# Patient Record
Sex: MALE | Race: WHITE | NOT HISPANIC OR LATINO | URBAN - METROPOLITAN AREA
[De-identification: names, ages, dates, MRNs, and addresses within clinical notes are randomized per-mention and may not be internally consistent; named-entity substitution may affect disease eponyms.]

---

## 2021-08-30 NOTE — IMPRESSION/PLAN
Impression: Other secondary cataract, bilateral: H26.493. Plan: No vision complaints at this time. No indication for treatment. Return if decreased vision.

## 2021-08-30 NOTE — IMPRESSION/PLAN
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 Recommend warm compresses, lid scrubs, and artificial tears QID or more. Discussed benefits of Omega-3 FA's. Will monitor patient for now. RTC if no improvement or worsens.

## 2022-02-23 NOTE — IMPRESSION/PLAN
Impression: Tear film insufficiency of bilateral lacrimal glands  Plan: Start art tears up to qid prn OU

## 2022-02-23 NOTE — IMPRESSION/PLAN
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
- likely ischemic etiology d/t extensive hemes & reduced VA 20/300 Plan: Onset 3 wk ago with severe vision loss, no changes since onset. Was seen with PCP 2 wk ago, reports BP normally ~140 but elevated today to 161/81mmHg. Will refer to retina in <2wk for f/u and notes to be sent to PCP.

## 2022-03-07 NOTE — IMPRESSION/PLAN
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 Micropulse OD performed today (4/9/60) without complication. R/B/A discussed at length.  Follow-up in 2-4 weeks for adjunctive avastin OD

## 2022-03-21 NOTE — IMPRESSION/PLAN
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 Micropulse OD performed 0/9/78 without complication. Continue adjunctive avastin OD as planned Avastin 1/3 OD administered today without complication. R/B/A discussed at length.  Follow-up in 4 weeks for avastin 2/3 OD

## 2022-04-18 NOTE — IMPRESSION/PLAN
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 Micropulse OD performed 2/4/30 without complication. Continue adjunctive avastin OD as planned Avastin 2/3 OD administered today without complication. R/B/A discussed at length.  Follow-up in 4 weeks for avastin 3/3 OD

## 2022-05-16 NOTE — IMPRESSION/PLAN
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 CME now resolving, continue [id number] avastin OD Avastin 1/3 OD administered today without complication. R/B/A discussed at length.  Follow-up in 4 weeks for avastin 2/3 OD

## 2022-06-21 NOTE — IMPRESSION/PLAN
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 CME now resolving, continue [id number] avastin OD Avastin 2/3 OD administered today without complication. R/B/A discussed at length.  Follow-up in 4 weeks for avastin 3/3 OD

## 2022-07-19 NOTE — IMPRESSION/PLAN
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 CME persists despite [id number] avastin OD, switch to Eylea OD Avastin 3/3 OD administered today without complication. R/B/A discussed at length.  Follow-up in 4 weeks for dilated exam, possible Eylea

## 2022-08-16 NOTE — IMPRESSION/PLAN
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 CME persists despite [id number] avastin OD, switch to Eylea OD Eylea (sample) 1/3 OD administered today without complication. R/B/A discussed at length.  Follow-up in 4 weeks for Eylea 2/3 OD

## 2022-09-13 NOTE — IMPRESSION/PLAN
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 CME persists despite [id number] avastin OD, switch to Eylea OD Eylea 2/3 OD administered today without complication. R/B/A discussed at length.  Follow-up in 4 weeks for Eylea 3/3 OD

## 2022-10-17 NOTE — IMPRESSION/PLAN
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 CME persists despite [id number] avastin OD, switch to Eylea OD Eylea 3/3 OD administered today without complication. R/B/A discussed at length.  Follow-up in 4 weeks for dilated exam, possible FA

## 2022-11-22 NOTE — IMPRESSION/PLAN
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 CME resolved, extend to 6 weeks Eylea 1/3 OD administered today without complication. R/B/A discussed at length. Follow-up in 6 weeks for Eylea 2/3 OD May update refraction

## 2023-01-03 NOTE — IMPRESSION/PLAN
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 CME resolved, extend to 6 weeks Eylea 2/3 OD administered today without complication. R/B/A discussed at length. Follow-up in 6 weeks for Eylea 3/3 OD May update refraction

## 2023-02-07 NOTE — IMPRESSION/PLAN
Impression: Other secondary cataract, bilateral: H26.493. Plan: Not primary cause of reduced vision OD, minimal effect on vision OS. 
No tx recommended at this time

## 2023-02-07 NOTE — IMPRESSION/PLAN
Impression: Central retinal vein occlusion, right eye, with macular edema: H34.8110.
-- dx 2/2022 Plan: Under care of Dr Laurie Birmingham Reviewed condition and contribution to reduced vision despite improvements in edema with injecitons.

## 2023-02-07 NOTE — IMPRESSION/PLAN
Impression: Presbyopia: H52.4. Plan: NI with refraction OD, pt did not appreciate any difference in vision. Will hold rx update.  Cont with current glasses

## 2023-02-07 NOTE — IMPRESSION/PLAN
Impression: Vitreous degeneration, right eye: H43.811. Plan: Reviewed stable findings, no retinal breaks, no detachment. 
Reviewed s/s of RD including flashes, incr floaters and curtain; if noted seek care asap

## 2023-02-07 NOTE — IMPRESSION/PLAN
Impression: Tear film insufficiency of bilateral lacrimal glands Plan: Dry eyes cont to contribute to the patient's complaints. Cont artificial tears qid prn OU.

## 2023-02-14 NOTE — IMPRESSION/PLAN
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 CME resolved, extend to 6 weeks Eylea 3/3 OD administered today without complication. R/B/A discussed at length.  Follow-up in 6 weeks for a dilated exam. 

May update refraction

## 2023-04-03 NOTE — IMPRESSION/PLAN
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 CME resolved, extend to 8 weeks Eylea 1/3 OD administered today without complication. R/B/A discussed at length. Follow-up in 8 weeks for Eylea 2/3 OD.

## 2023-05-23 ENCOUNTER — OFFICE VISIT (OUTPATIENT)
Dept: URBAN - METROPOLITAN AREA CLINIC 24 | Facility: CLINIC | Age: 88
End: 2023-05-23
Payer: MEDICARE

## 2023-05-23 DIAGNOSIS — H34.8110 CENTRAL RETINAL VEIN OCCLUSION, RIGHT EYE, WITH MACULAR EDEMA: Primary | ICD-10-CM

## 2023-05-23 PROCEDURE — 92134 CPTRZ OPH DX IMG PST SGM RTA: CPT | Performed by: OPHTHALMOLOGY

## 2023-05-23 PROCEDURE — 67028 INJECTION EYE DRUG: CPT | Performed by: OPHTHALMOLOGY

## 2023-05-23 ASSESSMENT — INTRAOCULAR PRESSURE
OD: 13
OS: 14

## 2023-05-23 NOTE — IMPRESSION/PLAN
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 CME resolved, extend to 8 weeks Eylea 2/3 OD administered today without complication. R/B/A discussed at length.  Follow-up in 8 weeks for Eylea 3/3 OD

## 2023-07-18 ENCOUNTER — OFFICE VISIT (OUTPATIENT)
Dept: URBAN - METROPOLITAN AREA CLINIC 24 | Facility: CLINIC | Age: 88
End: 2023-07-18
Payer: MEDICARE

## 2023-07-18 DIAGNOSIS — H34.8110 CENTRAL RETINAL VEIN OCCLUSION, RIGHT EYE, WITH MACULAR EDEMA: Primary | ICD-10-CM

## 2023-07-18 PROCEDURE — 92134 CPTRZ OPH DX IMG PST SGM RTA: CPT | Performed by: OPHTHALMOLOGY

## 2023-07-18 PROCEDURE — 67028 INJECTION EYE DRUG: CPT | Performed by: OPHTHALMOLOGY

## 2023-07-18 ASSESSMENT — INTRAOCULAR PRESSURE
OD: 12
OS: 13

## 2023-07-18 NOTE — IMPRESSION/PLAN
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 CME resolved, extend to 8 weeks Eylea 3/3 OD administered today without complication. R/B/A discussed at length.  Follow-up in 8 weeks for a dilated exam, possible FA, sooner PRN

## 2023-09-12 ENCOUNTER — OFFICE VISIT (OUTPATIENT)
Dept: URBAN - METROPOLITAN AREA CLINIC 24 | Facility: CLINIC | Age: 88
End: 2023-09-12
Payer: MEDICARE

## 2023-09-12 DIAGNOSIS — H34.8110 CENTRAL RETINAL VEIN OCCLUSION, RIGHT EYE, WITH MACULAR EDEMA: Primary | ICD-10-CM

## 2023-09-12 PROCEDURE — 67028 INJECTION EYE DRUG: CPT | Performed by: OPHTHALMOLOGY

## 2023-09-12 PROCEDURE — 92134 CPTRZ OPH DX IMG PST SGM RTA: CPT | Performed by: OPHTHALMOLOGY

## 2023-09-12 PROCEDURE — 92235 FLUORESCEIN ANGRPH MLTIFRAME: CPT | Performed by: OPHTHALMOLOGY

## 2023-09-12 PROCEDURE — 99214 OFFICE O/P EST MOD 30 MIN: CPT | Performed by: OPHTHALMOLOGY

## 2023-09-12 ASSESSMENT — INTRAOCULAR PRESSURE
OS: 10
OD: 8